# Patient Record
Sex: FEMALE | Race: WHITE | NOT HISPANIC OR LATINO | Employment: FULL TIME | URBAN - METROPOLITAN AREA
[De-identification: names, ages, dates, MRNs, and addresses within clinical notes are randomized per-mention and may not be internally consistent; named-entity substitution may affect disease eponyms.]

---

## 2019-09-19 LAB — RAPID GROUP A STREP (OHS): NEGATIVE

## 2021-09-24 LAB — RAPID GROUP A STREP (OHS): NEGATIVE

## 2022-01-16 ENCOUNTER — HISTORICAL (OUTPATIENT)
Dept: ADMINISTRATIVE | Facility: HOSPITAL | Age: 52
End: 2022-01-16

## 2022-01-16 LAB — SARS-COV-2 RNA RESP QL NAA+PROBE: NEGATIVE

## 2022-04-07 ENCOUNTER — HISTORICAL (OUTPATIENT)
Dept: ADMINISTRATIVE | Facility: HOSPITAL | Age: 52
End: 2022-04-07
Payer: COMMERCIAL

## 2022-04-24 VITALS
BODY MASS INDEX: 34.15 KG/M2 | DIASTOLIC BLOOD PRESSURE: 97 MMHG | OXYGEN SATURATION: 99 % | WEIGHT: 225.31 LBS | HEIGHT: 68 IN | SYSTOLIC BLOOD PRESSURE: 154 MMHG

## 2022-06-07 ENCOUNTER — OFFICE VISIT (OUTPATIENT)
Dept: URGENT CARE | Facility: CLINIC | Age: 52
End: 2022-06-07
Payer: COMMERCIAL

## 2022-06-07 VITALS
SYSTOLIC BLOOD PRESSURE: 140 MMHG | DIASTOLIC BLOOD PRESSURE: 88 MMHG | HEART RATE: 83 BPM | BODY MASS INDEX: 33.65 KG/M2 | OXYGEN SATURATION: 96 % | HEIGHT: 68 IN | TEMPERATURE: 99 F | WEIGHT: 222 LBS | RESPIRATION RATE: 18 BRPM

## 2022-06-07 DIAGNOSIS — J98.01 ACUTE BRONCHOSPASM: Primary | ICD-10-CM

## 2022-06-07 PROCEDURE — 1159F MED LIST DOCD IN RCRD: CPT | Mod: CPTII,,, | Performed by: FAMILY MEDICINE

## 2022-06-07 PROCEDURE — 1160F PR REVIEW ALL MEDS BY PRESCRIBER/CLIN PHARMACIST DOCUMENTED: ICD-10-PCS | Mod: CPTII,,, | Performed by: FAMILY MEDICINE

## 2022-06-07 PROCEDURE — 96372 THER/PROPH/DIAG INJ SC/IM: CPT | Mod: ,,, | Performed by: FAMILY MEDICINE

## 2022-06-07 PROCEDURE — 1159F PR MEDICATION LIST DOCUMENTED IN MEDICAL RECORD: ICD-10-PCS | Mod: CPTII,,, | Performed by: FAMILY MEDICINE

## 2022-06-07 PROCEDURE — 3077F SYST BP >= 140 MM HG: CPT | Mod: CPTII,,, | Performed by: FAMILY MEDICINE

## 2022-06-07 PROCEDURE — 99213 PR OFFICE/OUTPT VISIT, EST, LEVL III, 20-29 MIN: ICD-10-PCS | Mod: 25,,, | Performed by: FAMILY MEDICINE

## 2022-06-07 PROCEDURE — 3008F BODY MASS INDEX DOCD: CPT | Mod: CPTII,,, | Performed by: FAMILY MEDICINE

## 2022-06-07 PROCEDURE — 99213 OFFICE O/P EST LOW 20 MIN: CPT | Mod: 25,,, | Performed by: FAMILY MEDICINE

## 2022-06-07 PROCEDURE — 3008F PR BODY MASS INDEX (BMI) DOCUMENTED: ICD-10-PCS | Mod: CPTII,,, | Performed by: FAMILY MEDICINE

## 2022-06-07 PROCEDURE — 96372 PR INJECTION,THERAP/PROPH/DIAG2ST, IM OR SUBCUT: ICD-10-PCS | Mod: ,,, | Performed by: FAMILY MEDICINE

## 2022-06-07 PROCEDURE — 3077F PR MOST RECENT SYSTOLIC BLOOD PRESSURE >= 140 MM HG: ICD-10-PCS | Mod: CPTII,,, | Performed by: FAMILY MEDICINE

## 2022-06-07 PROCEDURE — 1160F RVW MEDS BY RX/DR IN RCRD: CPT | Mod: CPTII,,, | Performed by: FAMILY MEDICINE

## 2022-06-07 PROCEDURE — 4010F ACE/ARB THERAPY RXD/TAKEN: CPT | Mod: CPTII,,, | Performed by: FAMILY MEDICINE

## 2022-06-07 PROCEDURE — 4010F PR ACE/ARB THEARPY RXD/TAKEN: ICD-10-PCS | Mod: CPTII,,, | Performed by: FAMILY MEDICINE

## 2022-06-07 PROCEDURE — 3079F PR MOST RECENT DIASTOLIC BLOOD PRESSURE 80-89 MM HG: ICD-10-PCS | Mod: CPTII,,, | Performed by: FAMILY MEDICINE

## 2022-06-07 PROCEDURE — 3079F DIAST BP 80-89 MM HG: CPT | Mod: CPTII,,, | Performed by: FAMILY MEDICINE

## 2022-06-07 RX ORDER — MONTELUKAST SODIUM 10 MG/1
10 TABLET ORAL DAILY
COMMUNITY
Start: 2022-05-11

## 2022-06-07 RX ORDER — PIOGLITAZONEHYDROCHLORIDE 30 MG/1
30 TABLET ORAL
COMMUNITY
Start: 2021-09-24

## 2022-06-07 RX ORDER — METFORMIN HYDROCHLORIDE 500 MG/1
500 TABLET, EXTENDED RELEASE ORAL 2 TIMES DAILY
COMMUNITY
Start: 2022-04-19

## 2022-06-07 RX ORDER — ROSUVASTATIN CALCIUM 20 MG/1
20 TABLET, COATED ORAL NIGHTLY
COMMUNITY
Start: 2022-03-15

## 2022-06-07 RX ORDER — LOSARTAN POTASSIUM 50 MG/1
50 TABLET ORAL DAILY
COMMUNITY
Start: 2022-04-05

## 2022-06-07 RX ORDER — ALBUTEROL SULFATE 90 UG/1
AEROSOL, METERED RESPIRATORY (INHALATION)
COMMUNITY
Start: 2022-01-16

## 2022-06-07 RX ORDER — LEVOTHYROXINE, LIOTHYRONINE 19; 4.5 UG/1; UG/1
30 TABLET ORAL DAILY
COMMUNITY
Start: 2022-03-14

## 2022-06-07 RX ORDER — ESTRADIOL 1.53 MG/1
SPRAY TRANSDERMAL
COMMUNITY
Start: 2022-01-20

## 2022-06-07 RX ORDER — BETAMETHASONE SODIUM PHOSPHATE AND BETAMETHASONE ACETATE 3; 3 MG/ML; MG/ML
9 INJECTION, SUSPENSION INTRA-ARTICULAR; INTRALESIONAL; INTRAMUSCULAR; SOFT TISSUE ONCE
Status: COMPLETED | OUTPATIENT
Start: 2022-06-07 | End: 2022-06-07

## 2022-06-07 RX ADMIN — BETAMETHASONE SODIUM PHOSPHATE AND BETAMETHASONE ACETATE 9 MG: 3; 3 INJECTION, SUSPENSION INTRA-ARTICULAR; INTRALESIONAL; INTRAMUSCULAR; SOFT TISSUE at 09:06

## 2022-06-07 NOTE — PROGRESS NOTES
"Subjective:       Patient ID: Jesusita Lagunas is a 51 y.o. female.    Vitals:  height is 5' 8" (1.727 m) and weight is 100.7 kg (222 lb). Her oral temperature is 98.6 °F (37 °C). Her blood pressure is 140/88 (abnormal) and her pulse is 83. Her respiration is 18 and oxygen saturation is 96%.     Chief Complaint: Sore Throat (Sore throat x 2 days wheezing x 1 day)    Sore throat x 2 days wheezing x 1 day. Pt took home Covid test with negative results    Sore Throat   This is a recurrent problem. The current episode started in the past 7 days. The problem has been gradually worsening. Neither side of throat is experiencing more pain than the other. There has been no fever. The pain is at a severity of 0/10. The patient is experiencing no pain. Associated symptoms include coughing and shortness of breath. Associated symptoms comments: wheezing. Treatments tried: augmentin. The treatment provided no relief.     Ione :  51-year-old with known history of diabetes type 2, hypertension and allergies present to clinic with concerns of sore throat and wheezing since 2 days.  No measured fever at home.  Home COVID-19 test has been negative.  No concerns of positive exposure to infections.  States vaccinated for COVID-19.  Denies history of asthma in the past.  States tobacco use in the past, quit 2 years ago.  Congratulations.  Currently on loratadine Flonase and Singulair every day.  Requesting for cortisone shot as it helped in the past.  Understands the risk and benefits    ROS :  Constitutional_ No Fever, Body aches, Chills  HENT_Sore throat, Difficulty swallowing, postnasal drainage  Respiratory_no wheezing, no shortness of breath  Cardiovascular_no chest pain  Gastrointestinal_ No nausea,No vomiting, No diarrhea, No abdominal pain  Musculoskeletal_no joint pain, no joint swelling  Integumentary_no skin rash    Objective:      Physical Exam    General : Alert and Oriented, No apparent distress, afebrile, sounds stuffy " and congested  Neck - supple  HENT : Oropharynx no redness or swelling. Tonsils absent. TMs intact mild fluid no redness.   Respiratory : Bilateral equal breath sounds, nonlabored respirations, bilateral basal lung fields expiratory wheezing  Cardiovascular : Rate, rhythm regular, normal volume pulse, no murmur  Integumentary : Warm, Dry and no rash  Assessment:       1. Acute bronchospasm          Plan:     cool mist vaporizer to keep there was moist.  Continue albuterol inhaler as needed for shortness of breath coughing and wheezing.  [facility] today risk and benefits discussed voiced understanding.  Continue loratadine Flonase and Singulair  For worsening symptoms and signs of infection call clinic will consider antibiotics    Acute bronchospasm  -     betamethasone acetate-betamethasone sodium phosphate injection 9 mg

## 2022-06-09 ENCOUNTER — TELEPHONE (OUTPATIENT)
Dept: URGENT CARE | Facility: CLINIC | Age: 52
End: 2022-06-09
Payer: COMMERCIAL

## 2022-06-09 RX ORDER — BENZONATATE 200 MG/1
200 CAPSULE ORAL NIGHTLY
Qty: 5 CAPSULE | Refills: 0 | Status: SHIPPED | OUTPATIENT
Start: 2022-06-09 | End: 2022-06-14

## 2022-06-09 NOTE — TELEPHONE ENCOUNTER
----- Message from Savanah Griffith MD sent at 6/9/2022  2:46 PM CDT -----  Regarding: RE: pt requesting cough medication  Tessalon Perles sent, take only at bedtime.  Force fluids, saline nasal spray, honey, throat lozenges.  Do not take cough suppressants during the day. Allow lungs to clear during the day.  Also consider repeat home Covid test.  With less than 3 days of symptoms it is likely to miss the diagnosis.   ----- Message -----  From: Val Paige LPN  Sent: 6/9/2022   1:21 PM CDT  To: [facility] Results  Subject: pt requesting cough medication                     ----- Message -----  From: Neris Choi MA  Sent: 6/9/2022   1:19 PM CDT  To: [facility] Clinical Support Staff    Pt came in on 06/07/22, pt called today, states cough has not gotten better, wants to know if can get flores cough medicine. Phone Number is [phone]

## 2022-06-09 NOTE — TELEPHONE ENCOUNTER
Returned pt phone call. Notified of doctor rx and advise. Pt voiced thanks and understanding with no further questions.

## 2022-08-04 ENCOUNTER — TELEPHONE (OUTPATIENT)
Dept: URGENT CARE | Facility: CLINIC | Age: 52
End: 2022-08-04

## 2022-08-04 ENCOUNTER — OFFICE VISIT (OUTPATIENT)
Dept: URGENT CARE | Facility: CLINIC | Age: 52
End: 2022-08-04
Payer: COMMERCIAL

## 2022-08-04 VITALS
OXYGEN SATURATION: 99 % | WEIGHT: 225 LBS | TEMPERATURE: 99 F | HEART RATE: 88 BPM | SYSTOLIC BLOOD PRESSURE: 166 MMHG | BODY MASS INDEX: 34.1 KG/M2 | HEIGHT: 68 IN | DIASTOLIC BLOOD PRESSURE: 107 MMHG

## 2022-08-04 DIAGNOSIS — S13.4XXA WHIPLASH INJURIES, INITIAL ENCOUNTER: Primary | ICD-10-CM

## 2022-08-04 PROCEDURE — 4010F PR ACE/ARB THEARPY RXD/TAKEN: ICD-10-PCS | Mod: CPTII,,, | Performed by: FAMILY MEDICINE

## 2022-08-04 PROCEDURE — 1160F RVW MEDS BY RX/DR IN RCRD: CPT | Mod: CPTII,,, | Performed by: FAMILY MEDICINE

## 2022-08-04 PROCEDURE — 3008F PR BODY MASS INDEX (BMI) DOCUMENTED: ICD-10-PCS | Mod: CPTII,,, | Performed by: FAMILY MEDICINE

## 2022-08-04 PROCEDURE — 96372 PR INJECTION,THERAP/PROPH/DIAG2ST, IM OR SUBCUT: ICD-10-PCS | Mod: ,,, | Performed by: FAMILY MEDICINE

## 2022-08-04 PROCEDURE — 3080F PR MOST RECENT DIASTOLIC BLOOD PRESSURE >= 90 MM HG: ICD-10-PCS | Mod: CPTII,,, | Performed by: FAMILY MEDICINE

## 2022-08-04 PROCEDURE — 96372 THER/PROPH/DIAG INJ SC/IM: CPT | Mod: ,,, | Performed by: FAMILY MEDICINE

## 2022-08-04 PROCEDURE — 1160F PR REVIEW ALL MEDS BY PRESCRIBER/CLIN PHARMACIST DOCUMENTED: ICD-10-PCS | Mod: CPTII,,, | Performed by: FAMILY MEDICINE

## 2022-08-04 PROCEDURE — 3080F DIAST BP >= 90 MM HG: CPT | Mod: CPTII,,, | Performed by: FAMILY MEDICINE

## 2022-08-04 PROCEDURE — 99214 PR OFFICE/OUTPT VISIT, EST, LEVL IV, 30-39 MIN: ICD-10-PCS | Mod: 25,,, | Performed by: FAMILY MEDICINE

## 2022-08-04 PROCEDURE — 4010F ACE/ARB THERAPY RXD/TAKEN: CPT | Mod: CPTII,,, | Performed by: FAMILY MEDICINE

## 2022-08-04 PROCEDURE — 3077F SYST BP >= 140 MM HG: CPT | Mod: CPTII,,, | Performed by: FAMILY MEDICINE

## 2022-08-04 PROCEDURE — 3008F BODY MASS INDEX DOCD: CPT | Mod: CPTII,,, | Performed by: FAMILY MEDICINE

## 2022-08-04 PROCEDURE — 3077F PR MOST RECENT SYSTOLIC BLOOD PRESSURE >= 140 MM HG: ICD-10-PCS | Mod: CPTII,,, | Performed by: FAMILY MEDICINE

## 2022-08-04 PROCEDURE — 1159F PR MEDICATION LIST DOCUMENTED IN MEDICAL RECORD: ICD-10-PCS | Mod: CPTII,,, | Performed by: FAMILY MEDICINE

## 2022-08-04 PROCEDURE — 1159F MED LIST DOCD IN RCRD: CPT | Mod: CPTII,,, | Performed by: FAMILY MEDICINE

## 2022-08-04 PROCEDURE — 99214 OFFICE O/P EST MOD 30 MIN: CPT | Mod: 25,,, | Performed by: FAMILY MEDICINE

## 2022-08-04 RX ORDER — CHOLECALCIFEROL (VITAMIN D3) 25 MCG
1000 TABLET ORAL DAILY
COMMUNITY

## 2022-08-04 RX ORDER — BETAMETHASONE SODIUM PHOSPHATE AND BETAMETHASONE ACETATE 3; 3 MG/ML; MG/ML
6 INJECTION, SUSPENSION INTRA-ARTICULAR; INTRALESIONAL; INTRAMUSCULAR; SOFT TISSUE
Status: COMPLETED | OUTPATIENT
Start: 2022-08-04 | End: 2022-08-04

## 2022-08-04 RX ORDER — KETOROLAC TROMETHAMINE 30 MG/ML
30 INJECTION, SOLUTION INTRAMUSCULAR; INTRAVENOUS
Status: COMPLETED | OUTPATIENT
Start: 2022-08-04 | End: 2022-08-04

## 2022-08-04 RX ADMIN — BETAMETHASONE SODIUM PHOSPHATE AND BETAMETHASONE ACETATE 6 MG: 3; 3 INJECTION, SUSPENSION INTRA-ARTICULAR; INTRALESIONAL; INTRAMUSCULAR; SOFT TISSUE at 12:08

## 2022-08-04 RX ADMIN — KETOROLAC TROMETHAMINE 30 MG: 30 INJECTION, SOLUTION INTRAMUSCULAR; INTRAVENOUS at 12:08
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
## 2022-08-04 NOTE — PATIENT INSTRUCTIONS
ER for severe worsening.   In the meantime steroid and Toradol injection given today.   Brain rest for at least 3 days or until headache resolves.  If unable to avoid screens, reading, etc it will take longer for your brain to recover.

## 2022-09-21 ENCOUNTER — HISTORICAL (OUTPATIENT)
Dept: ADMINISTRATIVE | Facility: HOSPITAL | Age: 52
End: 2022-09-21
Payer: COMMERCIAL

## 2022-09-22 ENCOUNTER — HISTORICAL (OUTPATIENT)
Dept: ADMINISTRATIVE | Facility: HOSPITAL | Age: 52
End: 2022-09-22
Payer: COMMERCIAL

## 2024-05-27 ENCOUNTER — OFFICE VISIT (OUTPATIENT)
Dept: URGENT CARE | Facility: CLINIC | Age: 54
End: 2024-05-27
Payer: COMMERCIAL

## 2024-05-27 VITALS
DIASTOLIC BLOOD PRESSURE: 82 MMHG | OXYGEN SATURATION: 100 % | SYSTOLIC BLOOD PRESSURE: 123 MMHG | HEIGHT: 68 IN | HEART RATE: 100 BPM | TEMPERATURE: 98 F | RESPIRATION RATE: 18 BRPM | WEIGHT: 155 LBS | BODY MASS INDEX: 23.49 KG/M2

## 2024-05-27 DIAGNOSIS — B02.9 HERPES ZOSTER WITHOUT COMPLICATION: Primary | ICD-10-CM

## 2024-05-27 PROCEDURE — 99203 OFFICE O/P NEW LOW 30 MIN: CPT | Mod: ,,, | Performed by: PHYSICIAN ASSISTANT

## 2024-05-27 RX ORDER — BUPROPION HYDROCHLORIDE 150 MG/1
150 TABLET ORAL
COMMUNITY
Start: 2024-04-04

## 2024-05-27 RX ORDER — OXYCODONE AND ACETAMINOPHEN 5; 325 MG/1; MG/1
1 TABLET ORAL 2 TIMES DAILY PRN
COMMUNITY
Start: 2024-02-07

## 2024-05-27 RX ORDER — VALACYCLOVIR HYDROCHLORIDE 1 G/1
1000 TABLET, FILM COATED ORAL 3 TIMES DAILY
Qty: 21 TABLET | Refills: 0 | Status: SHIPPED | OUTPATIENT
Start: 2024-05-27 | End: 2024-06-03

## 2024-05-27 RX ORDER — CYCLOBENZAPRINE HCL 10 MG
10 TABLET ORAL NIGHTLY PRN
COMMUNITY
Start: 2024-02-07

## 2024-05-27 RX ORDER — ICOSAPENT ETHYL 1 G/1
2 CAPSULE ORAL 2 TIMES DAILY
COMMUNITY
Start: 2024-04-23

## 2024-05-27 RX ORDER — LIDOCAINE 50 MG/G
1 PATCH TOPICAL DAILY
Qty: 5 PATCH | Refills: 0 | Status: SHIPPED | OUTPATIENT
Start: 2024-05-27 | End: 2024-06-01

## 2024-05-27 NOTE — PROGRESS NOTES
"Subjective:      Patient ID: Jesusita Lagunas is a 53 y.o. female.    Vitals:  height is 5' 8" (1.727 m) and weight is 70.3 kg (155 lb). Her oral temperature is 98.4 °F (36.9 °C). Her blood pressure is 123/82 and her pulse is 100. Her respiration is 18 and oxygen saturation is 100%.     Chief Complaint: Rash    Female reports yesterday developing left lateral chest wall blisters x2 with mild itching and redness noticing a total of 4 blisters in line this morning presents to urgent Care for evaluation concern for new onset shingles.     .      Constitution: Negative for fever.   Cardiovascular: Negative.    Respiratory:  Negative for shortness of breath.    Skin:  Positive for rash, lesion and erythema. Negative for abrasion, abscess and hives.   Allergic/Immunologic: Positive for itching. Negative for hives.      Objective:     Physical Exam   Constitutional: She is oriented to person, place, and time. She appears well-developed.  Non-toxic appearance.      Comments:Awake alert pleasant ambulatory female     HENT:   Head: Normocephalic. Head is without abrasion, without contusion and without laceration.   Mouth/Throat: Oropharynx is clear and moist and mucous membranes are normal.   Eyes: Conjunctivae, EOM and lids are normal.   Neck: Trachea normal and phonation normal. Neck supple.   Cardiovascular: Normal rate, regular rhythm and normal pulses.   Pulmonary/Chest: Effort normal. No respiratory distress.       Musculoskeletal: Normal range of motion.         General: Normal range of motion.      Cervical back: She exhibits no tenderness.   Neurological: no focal deficit. She is alert and oriented to person, place, and time. No sensory deficit.   Skin: Skin is warm, dry, intact and not diaphoretic. Capillary refill takes less than 2 seconds. erythema and lesion No abrasion, No burn, No bruising and No ecchymosis   Psychiatric: Her speech is normal and behavior is normal. Mood, judgment and thought content normal. "   Nursing note and vitals reviewed.chaperone present (BETHANY Ly)         Assessment:     1. Herpes zoster without complication        Plan:     High concern today for acute shingles viral rash.  Recommend Valtrex medication to help reduce outbreak.  Alternate Tylenol and ibuprofen every 6-8 hours if needed for mild-to-moderate pain and inflammation.  May apply Lidoderm patch over rash and blisters to help reduce pain and sensitivity.  Recommend follow-up with primary care in 1-2 weeks for re-evaluation.  Herpes zoster without complication    Other orders  -     valACYclovir (VALTREX) 1000 MG tablet; Take 1 tablet (1,000 mg total) by mouth 3 (three) times daily. for 7 days  Dispense: 21 tablet; Refill: 0  -     LIDOcaine (LIDODERM) 5 %; Place 1 patch onto the skin once daily. Remove & Discard patch within 12 hours or as directed by MD for 5 days  Dispense: 5 patch; Refill: 0

## 2024-05-27 NOTE — PATIENT INSTRUCTIONS
High concern today for acute shingles viral rash.  Recommend Valtrex medication to help reduce outbreak.  Alternate Tylenol and ibuprofen every 6-8 hours if needed for mild-to-moderate pain and inflammation.  May apply Lidoderm patch over rash and blisters to help reduce pain and sensitivity.  Recommend follow-up with primary care in 1-2 weeks for re-evaluation.

## 2024-11-08 ENCOUNTER — OFFICE VISIT (OUTPATIENT)
Dept: URGENT CARE | Facility: CLINIC | Age: 54
End: 2024-11-08
Payer: COMMERCIAL

## 2024-11-08 VITALS
TEMPERATURE: 98 F | OXYGEN SATURATION: 98 % | HEIGHT: 68 IN | SYSTOLIC BLOOD PRESSURE: 136 MMHG | RESPIRATION RATE: 16 BRPM | HEART RATE: 80 BPM | BODY MASS INDEX: 23.67 KG/M2 | DIASTOLIC BLOOD PRESSURE: 85 MMHG | WEIGHT: 156.19 LBS

## 2024-11-08 DIAGNOSIS — M25.512 ACUTE PAIN OF LEFT SHOULDER: Primary | ICD-10-CM

## 2024-11-08 RX ORDER — DEXAMETHASONE SODIUM PHOSPHATE 10 MG/ML
10 INJECTION INTRAMUSCULAR; INTRAVENOUS
Status: COMPLETED | OUTPATIENT
Start: 2024-11-08 | End: 2024-11-08

## 2024-11-08 RX ORDER — CYCLOBENZAPRINE HCL 10 MG
10 TABLET ORAL 3 TIMES DAILY PRN
Qty: 15 TABLET | Refills: 0 | Status: SHIPPED | OUTPATIENT
Start: 2024-11-08 | End: 2024-11-18

## 2024-11-08 RX ORDER — DICLOFENAC SODIUM 50 MG/1
50 TABLET, DELAYED RELEASE ORAL 2 TIMES DAILY PRN
Qty: 14 TABLET | Refills: 0 | Status: SHIPPED | OUTPATIENT
Start: 2024-11-08 | End: 2024-11-15

## 2024-11-08 RX ADMIN — DEXAMETHASONE SODIUM PHOSPHATE 10 MG: 10 INJECTION INTRAMUSCULAR; INTRAVENOUS at 08:11

## 2024-11-08 NOTE — PROGRESS NOTES
"Subjective:      Patient ID: Jesusita Lagunas is a 54 y.o. female.    Vitals:  height is 5' 8" (1.727 m) and weight is 70.9 kg (156 lb 3.2 oz). Her oral temperature is 98.3 °F (36.8 °C). Her blood pressure is 136/85 and her pulse is 80. Her respiration is 16 and oxygen saturation is 98%.     Chief Complaint: Shoulder Pain and Arm Pain     Patient is a 54 y.o. female with past medical history of  neck pain, cervicalgia with radiculopathy status post fusion who presents to urgent care with complaints of left shoulder and arm pain, pain reproducible and worse with movement, trouble moving arm side-to-side and lifting arm x2 days.  Patient reports this does not feel like her usual neck pain.  Pt states it has gotten worse since yesterday. Alleviating factors include Percocet and Voltaren with moderate relief. Patient denies any known injury or trauma, fall, fever, numbness, tingling, weakness.  Denies any chest pain, shortness of breath.  She reports she took 1 leftover Percocet from her neck surgery and 1 leftover Voltaren with only mild relief of symptoms for 2 hours which allowed her to sleep.      ROS   Objective:     Physical Exam   Constitutional: She is oriented to person, place, and time. She appears well-developed. She is cooperative. No distress.   HENT:   Head: Normocephalic and atraumatic.   Nose: Nose normal.   Mouth/Throat: Oropharynx is clear and moist and mucous membranes are normal.   Eyes: Conjunctivae and lids are normal.   Neck: Trachea normal and phonation normal. Neck supple.   Cardiovascular: Normal rate, regular rhythm, normal heart sounds and normal pulses.   Pulmonary/Chest: Effort normal and breath sounds normal.   Abdominal: Normal appearance and bowel sounds are normal. She exhibits no mass. Soft.   Musculoskeletal:         General: No deformity.      Left shoulder: She exhibits decreased range of motion, tenderness and bony tenderness. She exhibits no crepitus, normal pulse and normal " strength.      Cervical back: She exhibits no bony tenderness.      Thoracic back: She exhibits no bony tenderness.        Arms:       Comments: There is some mild anterior tenderness to left shoulder, equal  strength, neurovascular intact, there is pain with range of motion, decreased range of motion of the left arm and shoulder, no pain with pronation, supination, no bony tenderness to the elbow or lower portion of the arm, there is no posterior shoulder pain   Neurological: She is alert and oriented to person, place, and time. She has normal strength and normal reflexes. No sensory deficit.   Skin: Skin is warm, dry, intact and not diaphoretic.   Psychiatric: Her speech is normal and behavior is normal. Judgment and thought content normal.   Nursing note and vitals reviewed.      Assessment:     1. Acute pain of left shoulder        Plan:       Acute pain of left shoulder  -     dexAMETHasone injection 10 mg  -     diclofenac (VOLTAREN) 50 MG EC tablet; Take 1 tablet (50 mg total) by mouth 2 (two) times daily as needed (pain).  Dispense: 14 tablet; Refill: 0  -     cyclobenzaprine (FLEXERIL) 10 MG tablet; Take 1 tablet (10 mg total) by mouth 3 (three) times daily as needed for Muscle spasms.  Dispense: 15 tablet; Refill: 0      As discussed as there was no trauma, will avoid x-rays at this time.  Will attempt anti-inflammatories, muscle relaxers, steroid injection as well as topical over-the-counter treatments.  Symptoms seem primarily related to the shoulder as there is some anterior tenderness to palpation and pain to the shoulder and upper arm with movement.  Did discussed possible soft tissue, ligament, tendon etiology.  Patient reports she will follow with her orthopedic specialist for her neck and have referral to shoulder if needed.  She denies any history of any kidney issues, reports her last labs were done within the last 2 months and were all within normal limits.  Has had Voltaren prescribed in  the past without any issues.      Take medications only as prescribed as they may cause sedation (safety precautions given).  Muscle relaxer may make you drowsy.  Take nightly.  Do not take over-the-counter anti-inflammatories such as Advil, Aleve, ibuprofen with prescription anti-inflammatory  Alternate with Tylenol as needed for pain.  Get plenty of rest.    Follow-up with Orthopedics as needed and as directed.    Go to the Emergency Department with any significant change or worsening symptoms.

## 2024-11-08 NOTE — PATIENT INSTRUCTIONS
Take medications only as prescribed as they may cause sedation (safety precautions given).  Muscle relaxer may make you drowsy.  Take nightly.  Do not take over-the-counter anti-inflammatories such as Advil, Aleve, ibuprofen with prescription anti-inflammatory  Alternate with Tylenol as needed for pain.  Get plenty of rest.  Use of over-the-counter topical patches and ointments as needed.  Follow-up with Orthopedics as needed and as directed.    Go to the Emergency Department with any significant change or worsening symptoms.

## 2024-12-22 ENCOUNTER — OFFICE VISIT (OUTPATIENT)
Dept: URGENT CARE | Facility: CLINIC | Age: 54
End: 2024-12-22
Payer: COMMERCIAL

## 2024-12-22 VITALS
HEART RATE: 72 BPM | SYSTOLIC BLOOD PRESSURE: 133 MMHG | RESPIRATION RATE: 18 BRPM | OXYGEN SATURATION: 98 % | BODY MASS INDEX: 23.64 KG/M2 | WEIGHT: 156 LBS | DIASTOLIC BLOOD PRESSURE: 84 MMHG | TEMPERATURE: 98 F | HEIGHT: 68 IN

## 2024-12-22 DIAGNOSIS — N30.01 ACUTE CYSTITIS WITH HEMATURIA: Primary | ICD-10-CM

## 2024-12-22 LAB
BILIRUB UR QL STRIP: POSITIVE
GLUCOSE UR QL STRIP: NEGATIVE
KETONES UR QL STRIP: NEGATIVE
LEUKOCYTE ESTERASE UR QL STRIP: POSITIVE
PH, POC UA: 7
POC BLOOD, URINE: POSITIVE
POC NITRATES, URINE: POSITIVE
PROT UR QL STRIP: NEGATIVE
SP GR UR STRIP: 1 (ref 1–1.03)
UROBILINOGEN UR STRIP-ACNC: 12 (ref 0.1–1.1)

## 2024-12-22 PROCEDURE — 87086 URINE CULTURE/COLONY COUNT: CPT

## 2024-12-22 PROCEDURE — 99213 OFFICE O/P EST LOW 20 MIN: CPT | Mod: ,,,

## 2024-12-22 PROCEDURE — 81003 URINALYSIS AUTO W/O SCOPE: CPT | Mod: QW,,,

## 2024-12-22 RX ORDER — NITROFURANTOIN 25; 75 MG/1; MG/1
100 CAPSULE ORAL 2 TIMES DAILY
Qty: 14 CAPSULE | Refills: 0 | Status: SHIPPED | OUTPATIENT
Start: 2024-12-22 | End: 2024-12-29

## 2024-12-22 NOTE — PROGRESS NOTES
"Subjective:      Patient ID: Jesusita Lagunas is a 54 y.o. female.    Vitals:  height is 5' 8" (1.727 m) and weight is 70.8 kg (156 lb). Her oral temperature is 98.2 °F (36.8 °C). Her blood pressure is 133/84 and her pulse is 72. Her respiration is 18 and oxygen saturation is 98%.     Chief Complaint: Urinary Frequency     Patient is a 54 y.o. female who presents to urgent care with complaints of burning with urination urine frequency that began 2 nights ago, she reports at that time she bites and noticed blood on the toilet paper, hematuria.  After this occurred she began taking azo.  Alleviating factors include AZO with mild amount of relief. Patient denies fever, body aches, chills, neck stiffness, rash, nausea, vomiting, diarrhea, abdominal pain, flank pain.    ROS   Objective:     Physical Exam   Constitutional: She is oriented to person, place, and time. She appears well-developed.   HENT:   Head: Normocephalic and atraumatic.   Ears:   Right Ear: External ear normal.   Left Ear: External ear normal.   Nose: Nose normal. No nasal deformity. No epistaxis.   Mouth/Throat: Oropharynx is clear and moist and mucous membranes are normal.   Eyes: Lids are normal.   Neck: Trachea normal and phonation normal. Neck supple.   Cardiovascular: Normal pulses.   Pulmonary/Chest: Effort normal.   Abdominal: Normal appearance and bowel sounds are normal. She exhibits no distension. Soft. There is no abdominal tenderness. There is no guarding, no left CVA tenderness and no right CVA tenderness.   Neurological: She is alert and oriented to person, place, and time.   Skin: Skin is warm, dry and intact.   Psychiatric: Her speech is normal and behavior is normal.   Nursing note and vitals reviewed.      Assessment:     1. Acute cystitis with hematuria        Plan:       Acute cystitis with hematuria  -     POCT Urinalysis, Dipstick, Manual, w/o Scope  -     Urine Culture High Risk  -     nitrofurantoin, macrocrystal-monohydrate, " (MACROBID) 100 MG capsule; Take 1 capsule (100 mg total) by mouth 2 (two) times daily. for 7 days  Dispense: 14 capsule; Refill: 0        Patient took AZO, Urine is positive for blood,UBG, bili, nitrite positive only 25 leukocytes However based on symptoms, holidays I will send in Macrobid for patient and call with culture results.        Will call in a few days with urine culture results.  Complete full course of antibiotics.      Drink plenty of water daily. Avoid soda.    Follow up with your primary care doctor as needed.    Present to the nearest Emergency Department with any significant change or worsening of symptoms including but not limited to blood in urine, nausea/vomiting, abdominal pain, fever, body aches, chills, or flank pain.

## 2024-12-22 NOTE — PATIENT INSTRUCTIONS
Will call in a few days with urine culture results.  Complete full course of antibiotics.      Drink plenty of water daily. Avoid soda.    Follow up with your primary care doctor as needed.    Present to the nearest Emergency Department with any significant change or worsening of symptoms including but not limited to blood in urine, nausea/vomiting, abdominal pain, fever, body aches, chills, or flank pain.

## 2024-12-24 LAB — BACTERIA UR CULT: NO GROWTH
